# Patient Record
Sex: FEMALE
[De-identification: names, ages, dates, MRNs, and addresses within clinical notes are randomized per-mention and may not be internally consistent; named-entity substitution may affect disease eponyms.]

---

## 2018-04-13 ENCOUNTER — HOSPITAL ENCOUNTER (EMERGENCY)
Dept: HOSPITAL 25 - UCCORT | Age: 58
Discharge: HOME | End: 2018-04-13
Payer: COMMERCIAL

## 2018-04-13 VITALS — SYSTOLIC BLOOD PRESSURE: 130 MMHG | DIASTOLIC BLOOD PRESSURE: 74 MMHG

## 2018-04-13 DIAGNOSIS — F32.9: Primary | ICD-10-CM

## 2018-04-13 DIAGNOSIS — Z88.0: ICD-10-CM

## 2018-04-13 PROCEDURE — G0463 HOSPITAL OUTPT CLINIC VISIT: HCPCS

## 2018-04-13 PROCEDURE — 99212 OFFICE O/P EST SF 10 MIN: CPT

## 2018-04-13 NOTE — UC
UC General HPI





- HPI Summary


HPI Summary: 





pt states hx of depression. takes abilify daily. is switching providers and ran 

out. she is requesting a refill. not suicidal or homicidal.





- History of Current Complaint


Chief Complaint: UCMedRefill


Stated Complaint: MED REFILL


Time Seen by Provider: 04/13/18 14:19


Hx Obtained From: Patient





- Allergy/Home Medications


Allergies/Adverse Reactions: 


 Allergies











Allergy/AdvReac Type Severity Reaction Status Date / Time


 


Penicillins Allergy  Vomiting Verified 04/13/18 14:19











Home Medications: 


 Home Medications





ARIPiprazole TAB* [Abilify  TAB*] 10 mg PO DAILY 04/13/18 [History Confirmed 04/ 13/18]


FLUoxetine CAP* [PROzac CAP*] 60 mg PO DAILY 04/13/18 [History Confirmed 04/13/ 18]











PMH/Surg Hx/FS Hx/Imm Hx


Endocrine History: Dyslipidemia


Psychological History: Depression





- Surgical History


Surgical History: Yes - ovary, breast





- Family History


Known Family History: Positive: Other - [place]





- Social History


Occupation: Employed Part-time


Lives: Alone





Review of Systems


Constitutional: Negative


Skin: Negative


Eyes: Negative


ENT: Negative


Respiratory: Negative


Cardiovascular: Negative


Gastrointestinal: Negative


Genitourinary: Negative


Motor: Negative


Neurovascular: Negative


Musculoskeletal: Negative


Neurological: Negative


Psychological: Depressed - hx but not suicidal


Is Patient Immunocompromised?: No


All Other Systems Reviewed And Are Negative: Yes





Physical Exam


Triage Information Reviewed: Yes


Appearance: Well-Appearing


Vital Signs Reviewed: Yes


Eyes: Positive: Conjunctiva Clear


ENT: Positive: Normal ENT inspection


Neck: Positive: Supple, Nontender, No Lymphadenopathy


Respiratory: Positive: Lungs clear, Normal breath sounds


Cardiovascular: Positive: RRR, No Murmur


Abdomen Description: Positive: Nontender, No Organomegaly, Soft


Bowel Sounds: Positive: Present


Musculoskeletal: Positive: ROM Intact


Neurological: Positive: Alert


Psychological: Positive: Age Appropriate Behavior


Skin Exam: Normal





Course/Dx





- Differential Dx - Multi-Symptom


Provider Diagnoses: medication refill





Discharge





- Sign-Out/Discharge


Documenting (check all that apply): Discharge





- Discharge Plan


Condition: Stable


Disposition: HOME


Prescriptions: 


ARIPiprazole TAB* [Abilify  TAB*] 10 mg PO DAILY #14 tab


Patient Education Materials:  Medicine Refill (ED)


Referrals: 


IRINA Robertson PA [Primary Care Provider] - As Soon As Possible





- Billing Disposition and Condition


Condition: STABLE


Disposition: HOME